# Patient Record
Sex: FEMALE | Race: WHITE | NOT HISPANIC OR LATINO | Employment: OTHER | URBAN - METROPOLITAN AREA
[De-identification: names, ages, dates, MRNs, and addresses within clinical notes are randomized per-mention and may not be internally consistent; named-entity substitution may affect disease eponyms.]

---

## 2022-06-08 RX ORDER — CITALOPRAM 20 MG/1
20 TABLET ORAL DAILY
COMMUNITY

## 2022-06-08 RX ORDER — LISINOPRIL 10 MG/1
10 TABLET ORAL DAILY
COMMUNITY
Start: 2022-04-18

## 2022-06-08 RX ORDER — NYSTATIN AND TRIAMCINOLONE ACETONIDE 100000; 1 [USP'U]/G; MG/G
OINTMENT TOPICAL
COMMUNITY
Start: 2022-03-24 | End: 2023-03-25

## 2022-06-08 RX ORDER — FAMOTIDINE 20 MG/1
20 TABLET, FILM COATED ORAL
COMMUNITY

## 2022-06-09 ENCOUNTER — OFFICE VISIT (OUTPATIENT)
Dept: SURGERY | Facility: CLINIC | Age: 67
End: 2022-06-09

## 2022-06-09 VITALS — RESPIRATION RATE: 17 BRPM | BODY MASS INDEX: 30.3 KG/M2 | HEIGHT: 63 IN | WEIGHT: 171 LBS

## 2022-06-09 DIAGNOSIS — N60.01 BREAST CYST, RIGHT: Primary | ICD-10-CM

## 2022-06-09 PROBLEM — Z86.010 HISTORY OF COLONIC POLYPS: Status: ACTIVE | Noted: 2021-11-18

## 2022-06-09 PROBLEM — Z80.0 FAMILY HISTORY OF COLON CANCER IN MOTHER: Status: ACTIVE | Noted: 2021-11-18

## 2022-06-09 PROCEDURE — 99203 OFFICE O/P NEW LOW 30 MIN: CPT | Performed by: SURGERY

## 2022-06-09 RX ORDER — DICLOFENAC SODIUM 75 MG/1
75 TABLET, DELAYED RELEASE ORAL
COMMUNITY

## 2022-06-09 NOTE — PROGRESS NOTES
Chief Complaint: Abnormal Breast Imaging    Subjective         History of Present Illness  Loreto Benz is a 67 y.o. female presents to [facility] to be seen for right breast cyst located at 0800 about 5 cm FTN.  It was measured to be about 8 mm in size.     Ultrasound confirms an 8 mm cyst corresponding to the mass seen on mammography.   ___________________________________     ASSESSMENT CATEGORY:   BIRADS Category 2:  Benign.  A letter regarding these results will be sent to the patient by the facility within 30 days.     Electronically Signed:   Rj Boss MD   2022/05/17 at 16:48 CDT   Reading Location ID and State: [address] / [place]   Tel [phone], Service support  [phone], Fax [phone]    Narrative      STUDY:   ULTRASOUND BREAST - RIGHT     REASON FOR EXAM:   Female, 67 years old.  Abnormal screening mammogram.     TECHNIQUE:   Axial and longitudinal images of the RIGHT breast were performed with a high resolution ultrasound transducer.     # OF IMAGES:  19     COMPARISON:   Diagnostic mammogram earlier today, screening mammogram 04/22/2022   Further ultrasonographic evaluation recommended, as described above.     ___________________________________     ASSESSMENT CATEGORY:   BIRADS Category 0:  Incomplete.  Need additional imaging evaluation.  A letter regarding these results will be sent to the patient by the facility within 30 days.     FOLLOW-UP RECOMMENDATION:   Ultrasound recommended. (I)     Approximately 10% of breast cancers are not detected by mammography.  A normal mammogram should not delay biopsy of a clinically suspicious abnormality.     Electronically Signed:   Rj Boss MD   2022/05/17 at 15:14 CDT   Reading Location ID and State: [address] / [place]   Tel [phone], Service support  [phone], Fax [phone]    Narrative      MAMMOGRAPHY - UNILATERAL DIAGNOSTIC:  RIGHT BREAST     REASON FOR EXAM:   Female, 67 years old.  Other  "abnormal and inconclusive findings on diagnostic imaging of breast     PERTINENT HISTORY:  Non-contributory.     TECHNIQUE: Digital examination.  Mediolateral oblique (MLO) and craniocaudad (CC) views of the breast were obtained. CAD:  CAD was not performed on this study.     COMPARISON:   04/22/2022     She is comfortable with not aspirating cyst.  She wanted second opinion about biopsy.       Objective     History reviewed. No pertinent past medical history.    History reviewed. No pertinent surgical history.      Current Outpatient Medications:   •  Cholecalciferol 50 MCG (2000 UT) tablet, Take 1,500 Units by mouth Daily., Disp: , Rfl:   •  citalopram (CeleXA) 20 MG tablet, Take 20 mg by mouth Daily., Disp: , Rfl:   •  cyanocobalamin (VITAMIN B-12) 500 MCG tablet, Take 500 mcg by mouth., Disp: , Rfl:   •  diclofenac (VOLTAREN) 75 MG EC tablet, Take 75 mg by mouth., Disp: , Rfl:   •  famotidine (PEPCID) 20 MG tablet, Take 20 mg by mouth., Disp: , Rfl:   •  lisinopril (PRINIVIL,ZESTRIL) 10 MG tablet, Take 10 mg by mouth Daily., Disp: , Rfl:   •  nystatin-triamcinolone (MYCOLOG) 290165-5.1 UNIT/GM-% ointment, Apply  topically to the appropriate area as directed., Disp: , Rfl:     No Known Allergies     History reviewed. No pertinent family history.    Social History     Socioeconomic History   • Marital status:    Tobacco Use   • Smoking status: Never Smoker   • Smokeless tobacco: Never Used   Vaping Use   • Vaping Use: Never used       Vital Signs:   Resp 17   Ht 160 cm (63\")   Wt 77.6 kg (171 lb)   BMI 30.29 kg/m²    Review of Systems    Physical Exam  Vitals and nursing note reviewed.   Constitutional:       Appearance: Normal appearance.   HENT:      Head: Normocephalic and atraumatic.   Eyes:      Extraocular Movements: Extraocular movements intact.      Pupils: Pupils are equal, round, and reactive to light.   Cardiovascular:      Pulses: Normal pulses.   Pulmonary:      Effort: Pulmonary effort is " normal. No accessory muscle usage or respiratory distress.   Chest:   Breasts:      Right: Normal. No inverted nipple, nipple discharge, skin change, axillary adenopathy or supraclavicular adenopathy.      Left: Normal. No inverted nipple, nipple discharge, skin change, axillary adenopathy or supraclavicular adenopathy.       Abdominal:      General: Abdomen is flat.      Palpations: Abdomen is soft.      Tenderness: There is no abdominal tenderness. There is no guarding.   Musculoskeletal:         General: No swelling, tenderness or deformity.      Cervical back: Neck supple.   Lymphadenopathy:      Upper Body:      Right upper body: No supraclavicular or axillary adenopathy.      Left upper body: No supraclavicular or axillary adenopathy.   Skin:     General: Skin is warm and dry.   Neurological:      General: No focal deficit present.      Mental Status: She is alert and oriented to person, place, and time.   Psychiatric:         Mood and Affect: Mood normal.         Thought Content: Thought content normal.          Result Review :               Assessment and Plan {CC Problem List  Visit Diagnosis  ROS  Review (Popup)  Health Maintenance  Quality  BestPractice  Medications  SmartSets  SnapShot Encounters  Media :23}   Diagnoses and all orders for this visit:    1. Breast cyst, right (Primary)    Follow up prn  Return to routine screening    Follow Up   No follow-ups on file.  Patient was given instructions and counseling regarding her condition or for health maintenance advice. Please see specific information pulled into the AVS if appropriate.         This document has been electronically signed by Merle Pearce MD  June 9, 2022 12:09 EDT